# Patient Record
Sex: FEMALE | Race: WHITE | NOT HISPANIC OR LATINO | Employment: UNEMPLOYED | URBAN - METROPOLITAN AREA
[De-identification: names, ages, dates, MRNs, and addresses within clinical notes are randomized per-mention and may not be internally consistent; named-entity substitution may affect disease eponyms.]

---

## 2019-02-06 ENCOUNTER — HOSPITAL ENCOUNTER (EMERGENCY)
Facility: HOSPITAL | Age: 34
Discharge: HOME | End: 2019-02-06
Attending: INTERNAL MEDICINE
Payer: COMMERCIAL

## 2019-02-06 VITALS
WEIGHT: 180 LBS | RESPIRATION RATE: 18 BRPM | TEMPERATURE: 99 F | HEIGHT: 65 IN | HEART RATE: 87 BPM | DIASTOLIC BLOOD PRESSURE: 82 MMHG | SYSTOLIC BLOOD PRESSURE: 132 MMHG | OXYGEN SATURATION: 99 % | BODY MASS INDEX: 29.99 KG/M2

## 2019-02-06 DIAGNOSIS — S39.012A LUMBAR STRAIN, INITIAL ENCOUNTER: ICD-10-CM

## 2019-02-06 DIAGNOSIS — S16.1XXA ACUTE STRAIN OF NECK MUSCLE, INITIAL ENCOUNTER: Primary | ICD-10-CM

## 2019-02-06 PROCEDURE — 72125 CT NECK SPINE W/O DYE: CPT | Mod: TC

## 2019-02-06 PROCEDURE — 96372 THER/PROPH/DIAG INJ SC/IM: CPT

## 2019-02-06 PROCEDURE — 99284 EMERGENCY DEPT VISIT MOD MDM: CPT | Mod: 25

## 2019-02-06 PROCEDURE — 72131 CT LUMBAR SPINE W/O DYE: CPT | Mod: TC

## 2019-02-06 PROCEDURE — 72125 CT CERVICAL SPINE WITHOUT CONTRAST: ICD-10-PCS | Mod: 26,,, | Performed by: RADIOLOGY

## 2019-02-06 PROCEDURE — 72131 CT LUMBAR SPINE W/O DYE: CPT | Mod: 26,,, | Performed by: RADIOLOGY

## 2019-02-06 PROCEDURE — 72131 CT LUMBAR SPINE WITHOUT CONTRAST: ICD-10-PCS | Mod: 26,,, | Performed by: RADIOLOGY

## 2019-02-06 PROCEDURE — 72125 CT NECK SPINE W/O DYE: CPT | Mod: 26,,, | Performed by: RADIOLOGY

## 2019-02-06 PROCEDURE — 63600175 PHARM REV CODE 636 W HCPCS: Performed by: INTERNAL MEDICINE

## 2019-02-06 RX ORDER — CYCLOBENZAPRINE HCL 10 MG
10 TABLET ORAL 3 TIMES DAILY PRN
Qty: 15 TABLET | Refills: 0 | Status: SHIPPED | OUTPATIENT
Start: 2019-02-06 | End: 2019-02-11

## 2019-02-06 RX ORDER — METAXALONE 800 MG/1
800 TABLET ORAL 3 TIMES DAILY
Qty: 15 TABLET | Refills: 0 | Status: SHIPPED | OUTPATIENT
Start: 2019-02-06 | End: 2019-02-11

## 2019-02-06 RX ORDER — NAPROXEN 500 MG/1
500 TABLET ORAL 2 TIMES DAILY WITH MEALS
Qty: 60 TABLET | Refills: 0 | Status: SHIPPED | OUTPATIENT
Start: 2019-02-06 | End: 2020-06-17

## 2019-02-06 RX ORDER — KETOROLAC TROMETHAMINE 30 MG/ML
60 INJECTION, SOLUTION INTRAMUSCULAR; INTRAVENOUS
Status: COMPLETED | OUTPATIENT
Start: 2019-02-06 | End: 2019-02-06

## 2019-02-06 RX ADMIN — KETOROLAC TROMETHAMINE 60 MG: 30 INJECTION, SOLUTION INTRAMUSCULAR; INTRAVENOUS at 02:02
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
## 2023-03-06 ENCOUNTER — HOSPITAL ENCOUNTER (EMERGENCY)
Facility: HOSPITAL | Age: 38
Discharge: HOME | End: 2023-03-06
Attending: FAMILY MEDICINE
Payer: MEDICAID

## 2023-03-06 VITALS
WEIGHT: 173 LBS | HEART RATE: 86 BPM | OXYGEN SATURATION: 100 % | BODY MASS INDEX: 28.82 KG/M2 | RESPIRATION RATE: 18 BRPM | TEMPERATURE: 98 F | SYSTOLIC BLOOD PRESSURE: 115 MMHG | DIASTOLIC BLOOD PRESSURE: 74 MMHG | HEIGHT: 65 IN

## 2023-03-06 DIAGNOSIS — B00.89 HERPETIC WHITLOW: Primary | ICD-10-CM

## 2023-03-06 PROCEDURE — 99283 EMERGENCY DEPT VISIT LOW MDM: CPT

## 2023-03-06 RX ORDER — VALACYCLOVIR HYDROCHLORIDE 1 G/1
1000 TABLET, FILM COATED ORAL 3 TIMES DAILY
Qty: 21 TABLET | Refills: 0 | Status: SHIPPED | OUTPATIENT
Start: 2023-03-06 | End: 2024-03-05

## 2023-03-06 NOTE — ED PROVIDER NOTES
Encounter Date: 3/6/2023       History     Chief Complaint   Patient presents with    Hand Pain     Pt c/o redness/swelling to R middle finger. Reports previous infection in same finger in October. Reports soreness started on Thursday, has progressively gotten worse since then.      Patient presents the ER with complaint of right middle finger pain.  The patient states October this last year she had the same lesions on her finger or was multiple small dots it became painful states she had 2 rounds of antibiotics and a resolved.  The patient states Thursday she noticed some soreness to the pad of her finger states now she feels it may be infected with a similar problem from October and she presents for emergent evaluation.  Patient denied any fever nausea vomiting diarrhea or other associated symptoms presents for emergent evaluation.  Patient denies drainage from the wound or decrease in range of motion of the finger.    The history is provided by the patient.   Review of patient's allergies indicates:   Allergen Reactions    Iodine and iodide containing products Other (See Comments)    Latex, natural rubber Rash     History reviewed. No pertinent past medical history.  Past Surgical History:   Procedure Laterality Date    breast aumentation      HEMORRHOID SURGERY      WISDOM TOOTH EXTRACTION       Family History   Problem Relation Age of Onset    Diabetes Father     Hypertension Father     Diabetes Mother     Hypertension Mother      Social History     Tobacco Use    Smoking status: Never    Smokeless tobacco: Never   Substance Use Topics    Alcohol use: Yes    Drug use: Never     Review of Systems   Constitutional:  Negative for fever.   Respiratory:  Negative for cough and shortness of breath.    Gastrointestinal:  Negative for abdominal pain, constipation, diarrhea and vomiting.   Musculoskeletal:  Negative for back pain and neck pain.   Skin:  Positive for rash (Right middle finger with swelling). Negative for  color change and wound.   All other systems reviewed and are negative.    Physical Exam     Initial Vitals [03/06/23 1038]   BP Pulse Resp Temp SpO2   115/74 86 18 98.1 °F (36.7 °C) 100 %      MAP       --         Physical Exam    Nursing note and vitals reviewed.  Constitutional: Vital signs are normal. She appears well-developed and well-nourished. She is cooperative.  Non-toxic appearance. She does not appear ill. No distress.   HENT:   Head: Atraumatic.   Mouth/Throat: Oropharynx is clear and moist.   Eyes: Right eye exhibits no discharge. Left eye exhibits no discharge.   Cardiovascular:  Normal rate and regular rhythm.           Pulmonary/Chest: No respiratory distress.   Musculoskeletal:         General: Normal range of motion.     Neurological: She is alert and oriented to person, place, and time. GCS score is 15. GCS eye subscore is 4. GCS verbal subscore is 5. GCS motor subscore is 6.   Skin: Skin is warm and dry. Capillary refill takes less than 2 seconds.   Mildly erythematic raised fluctuant lesion to for surface of right middle finger concerning for herpetic darinel measuring 0.5 cm by 0.5 cm   Psychiatric: She has a normal mood and affect. Her speech is normal and behavior is normal.       ED Course   Procedures  Labs Reviewed - No data to display       Imaging Results    None          Medications - No data to display  Medical Decision Making:   Differential Diagnosis:   Herpetic darinel, abscess, wart, cellulitis  ED Management:  Discussed with the patient exam findings discussed plan of care patient's history and exam is extremely concerning for herpetic darinel the snow and the I&D was performed in the emergency room.  The patient was prescribed Bactrim yesterday advised patient continue taking her Bactrim as well as star Valtrex to treat for herpetic darinel.  The patient verbalized her understanding her questions were answered.  Time of discharge patient is stable nontoxic appearing.                         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 Provider    valACYclovir (VALTREX) 1000 MG tablet Take 1 tablet (1,000 mg total) by mouth 3 (three) times daily. for 7 days 21 tablet 3/6/2023 3/13/2023 OLIVIA Sanz          Follow-up Information       Follow up With Specialties Details Why Contact [facility] Emergency Dept Emergency Medicine  If symptoms worsen [address] [address] [phone]             OLIVIA Sanz  03/06/23 1111

## 2024-07-15 ENCOUNTER — HOSPITAL ENCOUNTER (OUTPATIENT)
Dept: RADIOLOGY | Facility: HOSPITAL | Age: 39
Discharge: HOME | End: 2024-07-15
Attending: PHYSICIAN ASSISTANT
Payer: COMMERCIAL

## 2024-07-15 DIAGNOSIS — M67.911 UNSPECIFIED DISORDER OF SYNOVIUM AND TENDON, RIGHT SHOULDER: ICD-10-CM

## 2024-07-29 ENCOUNTER — PATIENT MESSAGE (OUTPATIENT)
Dept: FAMILY MEDICINE | Facility: CLINIC | Age: 39
End: 2024-07-29
Payer: COMMERCIAL

## 2024-07-30 ENCOUNTER — LAB VISIT (OUTPATIENT)
Dept: LAB | Facility: CLINIC | Age: 39
End: 2024-07-30
Payer: COMMERCIAL

## 2024-07-30 ENCOUNTER — OFFICE VISIT (OUTPATIENT)
Dept: FAMILY MEDICINE | Facility: CLINIC | Age: 39
End: 2024-07-30
Payer: COMMERCIAL

## 2024-07-30 VITALS
DIASTOLIC BLOOD PRESSURE: 60 MMHG | WEIGHT: 190 LBS | BODY MASS INDEX: 31.65 KG/M2 | HEIGHT: 65 IN | RESPIRATION RATE: 18 BRPM | OXYGEN SATURATION: 99 % | SYSTOLIC BLOOD PRESSURE: 105 MMHG

## 2024-07-30 DIAGNOSIS — Z11.59 NEED FOR HEPATITIS C SCREENING TEST: ICD-10-CM

## 2024-07-30 DIAGNOSIS — Z11.4 ENCOUNTER FOR SCREENING FOR HIV: ICD-10-CM

## 2024-07-30 DIAGNOSIS — E66.09 CLASS 1 OBESITY DUE TO EXCESS CALORIES WITHOUT SERIOUS COMORBIDITY WITH BODY MASS INDEX (BMI) OF 31.0 TO 31.9 IN ADULT: ICD-10-CM

## 2024-07-30 DIAGNOSIS — M54.12 CERVICAL RADICULOPATHY: ICD-10-CM

## 2024-07-30 DIAGNOSIS — Z76.89 ENCOUNTER TO ESTABLISH CARE: ICD-10-CM

## 2024-07-30 DIAGNOSIS — E66.09 CLASS 1 OBESITY DUE TO EXCESS CALORIES WITHOUT SERIOUS COMORBIDITY WITH BODY MASS INDEX (BMI) OF 31.0 TO 31.9 IN ADULT: Primary | ICD-10-CM

## 2024-07-30 PROBLEM — E66.811 CLASS 1 OBESITY DUE TO EXCESS CALORIES WITHOUT SERIOUS COMORBIDITY WITH BODY MASS INDEX (BMI) OF 31.0 TO 31.9 IN ADULT: Status: ACTIVE | Noted: 2024-07-30

## 2024-07-30 LAB
ALBUMIN SERPL BCP-MCNC: 3.9 G/DL (ref 3.5–5.2)
ALP SERPL-CCNC: 73 U/L (ref 55–135)
ALT SERPL W/O P-5'-P-CCNC: 23 U/L (ref 10–44)
ANION GAP SERPL CALC-SCNC: 4 MMOL/L (ref 8–16)
AST SERPL-CCNC: 18 U/L (ref 10–40)
BASOPHILS # BLD AUTO: 0.06 K/UL (ref 0–0.2)
BASOPHILS NFR BLD: 0.9 % (ref 0–1.9)
BILIRUB SERPL-MCNC: 0.4 MG/DL (ref 0.1–1)
BUN SERPL-MCNC: 10 MG/DL (ref 6–20)
CALCIUM SERPL-MCNC: 9.1 MG/DL (ref 8.7–10.5)
CHLORIDE SERPL-SCNC: 107 MMOL/L (ref 95–110)
CHOLEST SERPL-MCNC: 177 MG/DL (ref 120–199)
CHOLEST/HDLC SERPL: 3.5 {RATIO} (ref 2–5)
CO2 SERPL-SCNC: 26 MMOL/L (ref 23–29)
CREAT SERPL-MCNC: 0.8 MG/DL (ref 0.5–1.4)
DIFFERENTIAL METHOD BLD: NORMAL
EOSINOPHIL # BLD AUTO: 0.1 K/UL (ref 0–0.5)
EOSINOPHIL NFR BLD: 0.9 % (ref 0–8)
ERYTHROCYTE [DISTWIDTH] IN BLOOD BY AUTOMATED COUNT: 12 % (ref 11.5–14.5)
EST. GFR  (NO RACE VARIABLE): >60 ML/MIN/1.73 M^2
ESTIMATED AVG GLUCOSE: 103 MG/DL (ref 68–131)
GLUCOSE SERPL-MCNC: 91 MG/DL (ref 70–110)
HBA1C MFR BLD: 5.2 % (ref 4–5.6)
HCT VFR BLD AUTO: 39.5 % (ref 37–48.5)
HCV AB SERPL QL IA: NORMAL
HDLC SERPL-MCNC: 50 MG/DL (ref 40–75)
HDLC SERPL: 28.2 % (ref 20–50)
HGB BLD-MCNC: 12.9 G/DL (ref 12–16)
HIV 1+2 AB+HIV1 P24 AG SERPL QL IA: NORMAL
IMM GRANULOCYTES # BLD AUTO: 0.01 K/UL (ref 0–0.04)
IMM GRANULOCYTES NFR BLD AUTO: 0.1 % (ref 0–0.5)
LDLC SERPL CALC-MCNC: 109.6 MG/DL (ref 63–159)
LYMPHOCYTES # BLD AUTO: 2.5 K/UL (ref 1–4.8)
LYMPHOCYTES NFR BLD: 36 % (ref 18–48)
MCH RBC QN AUTO: 30.3 PG (ref 27–31)
MCHC RBC AUTO-ENTMCNC: 32.7 G/DL (ref 32–36)
MCV RBC AUTO: 93 FL (ref 82–98)
MONOCYTES # BLD AUTO: 0.6 K/UL (ref 0.3–1)
MONOCYTES NFR BLD: 8.2 % (ref 4–15)
NEUTROPHILS # BLD AUTO: 3.8 K/UL (ref 1.8–7.7)
NEUTROPHILS NFR BLD: 53.9 % (ref 38–73)
NONHDLC SERPL-MCNC: 127 MG/DL
NRBC BLD-RTO: 0 /100 WBC
PLATELET # BLD AUTO: 384 K/UL (ref 150–450)
PMV BLD AUTO: 10.4 FL (ref 9.2–12.9)
POTASSIUM SERPL-SCNC: 3.9 MMOL/L (ref 3.5–5.1)
PROT SERPL-MCNC: 7.3 G/DL (ref 6–8.4)
RBC # BLD AUTO: 4.26 M/UL (ref 4–5.4)
SODIUM SERPL-SCNC: 137 MMOL/L (ref 136–145)
TRIGL SERPL-MCNC: 87 MG/DL (ref 30–150)
WBC # BLD AUTO: 6.95 K/UL (ref 3.9–12.7)

## 2024-07-30 PROCEDURE — 3008F BODY MASS INDEX DOCD: CPT | Mod: CPTII,S$GLB,, | Performed by: STUDENT IN AN ORGANIZED HEALTH CARE EDUCATION/TRAINING PROGRAM

## 2024-07-30 PROCEDURE — 86803 HEPATITIS C AB TEST: CPT | Performed by: STUDENT IN AN ORGANIZED HEALTH CARE EDUCATION/TRAINING PROGRAM

## 2024-07-30 PROCEDURE — 1159F MED LIST DOCD IN RCRD: CPT | Mod: CPTII,S$GLB,, | Performed by: STUDENT IN AN ORGANIZED HEALTH CARE EDUCATION/TRAINING PROGRAM

## 2024-07-30 PROCEDURE — 87389 HIV-1 AG W/HIV-1&-2 AB AG IA: CPT | Performed by: STUDENT IN AN ORGANIZED HEALTH CARE EDUCATION/TRAINING PROGRAM

## 2024-07-30 PROCEDURE — 83036 HEMOGLOBIN GLYCOSYLATED A1C: CPT | Performed by: STUDENT IN AN ORGANIZED HEALTH CARE EDUCATION/TRAINING PROGRAM

## 2024-07-30 PROCEDURE — 85025 COMPLETE CBC W/AUTO DIFF WBC: CPT | Performed by: STUDENT IN AN ORGANIZED HEALTH CARE EDUCATION/TRAINING PROGRAM

## 2024-07-30 PROCEDURE — 3074F SYST BP LT 130 MM HG: CPT | Mod: CPTII,S$GLB,, | Performed by: STUDENT IN AN ORGANIZED HEALTH CARE EDUCATION/TRAINING PROGRAM

## 2024-07-30 PROCEDURE — 99214 OFFICE O/P EST MOD 30 MIN: CPT | Mod: S$GLB,,, | Performed by: STUDENT IN AN ORGANIZED HEALTH CARE EDUCATION/TRAINING PROGRAM

## 2024-07-30 PROCEDURE — 80053 COMPREHEN METABOLIC PANEL: CPT | Performed by: STUDENT IN AN ORGANIZED HEALTH CARE EDUCATION/TRAINING PROGRAM

## 2024-07-30 PROCEDURE — 80061 LIPID PANEL: CPT | Performed by: STUDENT IN AN ORGANIZED HEALTH CARE EDUCATION/TRAINING PROGRAM

## 2024-07-30 PROCEDURE — 36415 COLL VENOUS BLD VENIPUNCTURE: CPT | Mod: ,,, | Performed by: STUDENT IN AN ORGANIZED HEALTH CARE EDUCATION/TRAINING PROGRAM

## 2024-07-30 PROCEDURE — 3078F DIAST BP <80 MM HG: CPT | Mod: CPTII,S$GLB,, | Performed by: STUDENT IN AN ORGANIZED HEALTH CARE EDUCATION/TRAINING PROGRAM

## 2024-07-30 RX ORDER — MELOXICAM 15 MG/1
15 TABLET ORAL DAILY
Qty: 30 TABLET | Refills: 1 | Status: SHIPPED | OUTPATIENT
Start: 2024-07-30

## 2024-07-30 RX ORDER — ALPRAZOLAM 1 MG/1
1 TABLET ORAL 2 TIMES DAILY
Qty: 2 TABLET | Refills: 0 | Status: SHIPPED | OUTPATIENT
Start: 2024-07-30 | End: 2024-08-29

## 2024-07-30 RX ORDER — GABAPENTIN 300 MG/1
300 CAPSULE ORAL 3 TIMES DAILY
Qty: 90 CAPSULE | Refills: 11 | Status: SHIPPED | OUTPATIENT
Start: 2024-07-30 | End: 2025-07-30

## 2024-07-30 NOTE — PROGRESS NOTES
"  [facility]  [address], [address]  [place] [address]    Subjective     Patient ID: Shruthi Dempsey is a 39 y.o. female who comes to the clinic to establish care.    Chief Complaint: Establish Care (Establish Care)    Right arm pain - has been going on for years but has worsened recently. No trauma that she's aware of. Has pain that radiates down her right arm into fingers, her fingers can go completely numb, same with her hand. Has seen pain mgmt but "they didn't do anything". Has had an injection into her back and that did not help. Had an MRI of her arm scheduled but she "got scared" and didn't follow through with it.     Had a NCS test done and it showed "problems" at her neck and elbow but that's all she knows. It was at [facility]. Was written a muscle relaxer but she never took them.     ROS negative unless stated above       Objective     Vitals:    07/30/24 1004   BP: 105/60   BP Location: Left arm   Patient Position: Sitting   BP Method: Large (Manual)   Resp: 18   SpO2: 99%   Weight: 86.2 kg (190 lb)   Height: 5' 5" (1.651 m)        Wt Readings from Last 3 Encounters:   07/30/24 1004 86.2 kg (190 lb)   03/05/24 1424 83.9 kg (185 lb)   03/06/23 1038 78.5 kg (173 lb)        Physical Exam  Vitals reviewed.   Constitutional:       Appearance: Normal appearance.   HENT:      Head: Normocephalic and atraumatic.   Eyes:      Extraocular Movements: Extraocular movements intact.      Pupils: Pupils are equal, round, and reactive to light.   Cardiovascular:      Rate and Rhythm: Normal rate.      Pulses: Normal pulses.      Heart sounds: Normal heart sounds.   Pulmonary:      Effort: Pulmonary effort is normal. No respiratory distress.      Breath sounds: Normal breath sounds.   Musculoskeletal:         General: Normal range of motion.      Cervical back: Normal range of motion and neck supple.   Skin:     General: Skin is dry.      Capillary Refill: " Capillary refill takes less than 2 seconds.   Neurological:      General: No focal deficit present.      Mental Status: She is alert and oriented to person, place, and time. Mental status is at baseline.   Psychiatric:         Mood and Affect: Mood normal.         Behavior: Behavior normal.         Thought Content: Thought content normal.         Current Outpatient Medications   Medication Instructions    ALPRAZolam (XANAX) 1 mg, Oral, 2 times daily    gabapentin (NEURONTIN) 300 mg, Oral, 3 times daily    meloxicam (MOBIC) 15 mg, Oral, Daily           Assessment and Plan     1. Class 1 obesity due to excess calories without serious comorbidity with body mass index (BMI) of 31.0 to 31.9 in adult  -     Lipid panel; Future; Expected date: 07/30/2024  -     Hemoglobin A1c; Future; Expected date: 07/30/2024  -     Comprehensive metabolic panel; Future; Expected date: 07/30/2024  -     CBC auto differential; Future; Expected date: 07/30/2024    2. Encounter to establish care    3. Encounter for screening for HIV  -     HIV 1/2 Ag/Ab (4th Gen); Future; Expected date: 07/30/2024    4. Need for hepatitis C screening test  -     Hepatitis C antibody; Future; Expected date: 07/30/2024    5. Cervical radiculopathy  -     meloxicam (MOBIC) 15 MG tablet; Take 1 tablet (15 mg total) by mouth once daily.  Dispense: 30 tablet; Refill: 1  -     ALPRAZolam (XANAX) 1 MG tablet; Take 1 tablet (1 mg total) by mouth 2 (two) times daily.  Dispense: 2 tablet; Refill: 0  -     gabapentin (NEURONTIN) 300 MG capsule; Take 1 capsule (300 mg total) by mouth 3 (three) times daily.  Dispense: 90 capsule; Refill: 11  -     MRI Cervical Spine Without Contrast; Future; Expected date: 07/30/2024        Here to establish care    For radiculopathy, sounds like it's coming from her neck since it's affecting her whole arm. Need imaging but patient has anxiety, writing xanax for this, can take two if she's still not feeling the effect prior to the study.  Writing mobic and gabapentin as well, went over SE's    For labs, see above    RTC in 1 month         I encouraged the patient to take all medications as prescribed and to keep follow up appointments with their providers. Patient stated they had no other concerns. Questions were invited and answered. Follow up sooner if needed.     Harinder Mccray MD  07/30/2024 10:12 AM

## 2024-08-13 ENCOUNTER — TELEPHONE (OUTPATIENT)
Dept: FAMILY MEDICINE | Facility: CLINIC | Age: 39
End: 2024-08-13
Payer: COMMERCIAL

## 2024-08-13 NOTE — TELEPHONE ENCOUNTER
----- Message from Elis Vega sent at 8/13/2024 12:10 PM CDT -----  Contact: PT  Type:  Apoointment Request    Name of Caller:PT   When is the first available appointment?N/A - PT PREFERS THURS 08/15/24 MORNING   Symptoms:PANIC ATTACKS FROM RX gabapentin (NEURONTIN) 300 MG capsule OR THE meloxicam (MOBIC) 15 MG tablet  Would the patient rather a call back or a response via [facility]? CALL   Best Call Back Number:[phone] (home)   Additional Information: UNABLE TO STEWART - PT HAS [employer] EPO  THANK YOU

## 2024-08-15 ENCOUNTER — OFFICE VISIT (OUTPATIENT)
Dept: FAMILY MEDICINE | Facility: CLINIC | Age: 39
End: 2024-08-15
Payer: COMMERCIAL

## 2024-08-15 VITALS
RESPIRATION RATE: 18 BRPM | SYSTOLIC BLOOD PRESSURE: 110 MMHG | WEIGHT: 191 LBS | HEART RATE: 86 BPM | DIASTOLIC BLOOD PRESSURE: 65 MMHG | BODY MASS INDEX: 31.82 KG/M2 | OXYGEN SATURATION: 100 % | HEIGHT: 65 IN

## 2024-08-15 DIAGNOSIS — M54.12 CERVICAL RADICULOPATHY: Primary | ICD-10-CM

## 2024-08-15 DIAGNOSIS — B00.89 HERPETIC WHITLOW: ICD-10-CM

## 2024-08-15 PROCEDURE — 3008F BODY MASS INDEX DOCD: CPT | Mod: CPTII,S$GLB,, | Performed by: STUDENT IN AN ORGANIZED HEALTH CARE EDUCATION/TRAINING PROGRAM

## 2024-08-15 PROCEDURE — 1160F RVW MEDS BY RX/DR IN RCRD: CPT | Mod: CPTII,S$GLB,, | Performed by: STUDENT IN AN ORGANIZED HEALTH CARE EDUCATION/TRAINING PROGRAM

## 2024-08-15 PROCEDURE — 3044F HG A1C LEVEL LT 7.0%: CPT | Mod: CPTII,S$GLB,, | Performed by: STUDENT IN AN ORGANIZED HEALTH CARE EDUCATION/TRAINING PROGRAM

## 2024-08-15 PROCEDURE — 3078F DIAST BP <80 MM HG: CPT | Mod: CPTII,S$GLB,, | Performed by: STUDENT IN AN ORGANIZED HEALTH CARE EDUCATION/TRAINING PROGRAM

## 2024-08-15 PROCEDURE — 1159F MED LIST DOCD IN RCRD: CPT | Mod: CPTII,S$GLB,, | Performed by: STUDENT IN AN ORGANIZED HEALTH CARE EDUCATION/TRAINING PROGRAM

## 2024-08-15 PROCEDURE — 3074F SYST BP LT 130 MM HG: CPT | Mod: CPTII,S$GLB,, | Performed by: STUDENT IN AN ORGANIZED HEALTH CARE EDUCATION/TRAINING PROGRAM

## 2024-08-15 PROCEDURE — 99214 OFFICE O/P EST MOD 30 MIN: CPT | Mod: S$GLB,,, | Performed by: STUDENT IN AN ORGANIZED HEALTH CARE EDUCATION/TRAINING PROGRAM

## 2024-08-15 RX ORDER — VALACYCLOVIR HYDROCHLORIDE 1 G/1
1000 TABLET, FILM COATED ORAL DAILY
Qty: 20 TABLET | Refills: 3 | Status: SHIPPED | OUTPATIENT
Start: 2024-08-15 | End: 2024-08-20

## 2024-08-15 NOTE — PROGRESS NOTES
"  [facility]    [facility]  [address], [address]  [place] [address]    Subjective     Patient ID: Shruthi Dempsey is a 39 y.o. female who comes to the clinic for a follow up visit.    Chief Complaint: Follow-up (Medication follow up)    Palpitations - has started since taking gabapentin and mobic but she also drinks multiple cups of coffee and loaded teas.     Cervical radiculopathy - MRI cervical spine was cancelled because she didn't do PT, will order that today, mobic didn't seem to help and neither did gabapentin but it did help w/ her sleep    ROS negative unless stated above       Objective     Vitals:    08/15/24 1044   BP: 110/65   BP Location: Left arm   Patient Position: Sitting   BP Method: Large (Manual)   Pulse: 86   Resp: 18   SpO2: 100%   Weight: 86.6 kg (191 lb)   Height: 5' 5" (1.651 m)        Wt Readings from Last 3 Encounters:   08/15/24 1044 86.6 kg (191 lb)   07/30/24 1004 86.2 kg (190 lb)   03/05/24 1424 83.9 kg (185 lb)        Physical Exam  Vitals reviewed.   Constitutional:       Appearance: Normal appearance.   HENT:      Head: Normocephalic and atraumatic.   Eyes:      Extraocular Movements: Extraocular movements intact.      Pupils: Pupils are equal, round, and reactive to light.   Cardiovascular:      Rate and Rhythm: Normal rate.   Pulmonary:      Effort: Pulmonary effort is normal. No respiratory distress.   Musculoskeletal:         General: Normal range of motion.      Cervical back: Normal range of motion and neck supple.   Skin:     General: Skin is dry.      Capillary Refill: Capillary refill takes less than 2 seconds.   Neurological:      General: No focal deficit present.      Mental Status: She is alert and oriented to person, place, and time. Mental status is at baseline.   Psychiatric:         Mood and Affect: Mood normal.         Behavior: Behavior normal.         Thought Content: Thought content normal.         Current " Outpatient Medications   Medication Instructions    gabapentin (NEURONTIN) 300 mg, Oral, 3 times daily    valACYclovir (VALTREX) 1,000 mg, Oral, Daily           Assessment and Plan     1. Cervical radiculopathy  -     Ambulatory referral/consult to Physical/Occupational Therapy; Future; Expected date: 08/22/2024    2. Herpetic darinel  -     valACYclovir (VALTREX) 1000 MG tablet; Take 1 tablet (1,000 mg total) by mouth once daily. for 5 days  Dispense: 20 tablet; Refill: 3        Here for follow up    PT referral placed in [place]    For herpetic darinel, traeating w/ valtrex, gave her some extra tabs for future episodes    For palpitations, treating w/ caffeine avoidance, will hold off on treating possible anxiety but will approach at f/u if not improving    Visit today included increased complexity associated with the care of the episodic problem cervical radiculopathy addressed and managing the longitudinal care of the patient due to the serious and/or complex managed problem(s)     RTC in 2 months         I encouraged the patient to take all medications as prescribed and to keep follow up appointments with their providers. Patient stated they had no other concerns. Questions were invited and answered. Follow up sooner if needed.     Harinder Mccray MD  08/15/2024 10:48 AM

## 2024-08-22 ENCOUNTER — TELEPHONE (OUTPATIENT)
Dept: FAMILY MEDICINE | Facility: CLINIC | Age: 39
End: 2024-08-22
Payer: COMMERCIAL

## 2024-08-22 NOTE — TELEPHONE ENCOUNTER
----- Message from Christine Choi sent at 8/22/2024  2:03 PM CDT -----  Contact: pt  Type: Needs Medical Advice    Who Called: pt    Best Call Back Number:[phone]    Additional Information: Requesting a call back regarding pot needs a new referral for physical therapy. Last referral provider was out on pt network.  However another provider in same office is in network. Please send new referral to office below.       physical therapy -   Libby   [facility]  Address: [address] [place] [address] [place] [address]  Fax# [phone]    Phone: [phone]      Please Advise- Thank you

## 2024-08-22 NOTE — TELEPHONE ENCOUNTER
----- Message from Didi Hidalgo sent at 8/22/2024  3:57 PM CDT -----  Type:  Patient Returning Call    Who Called:  pt   Who Left Message for Patient:  juan   Does the patient know what this is regarding?:  yes   Best Call Back Number:  [phone] (home)     Additional Information:  please advise

## 2024-08-29 ENCOUNTER — TELEPHONE (OUTPATIENT)
Dept: FAMILY MEDICINE | Facility: CLINIC | Age: 39
End: 2024-08-29
Payer: COMMERCIAL

## 2024-09-09 ENCOUNTER — TELEPHONE (OUTPATIENT)
Dept: FAMILY MEDICINE | Facility: CLINIC | Age: 39
End: 2024-09-09
Payer: COMMERCIAL

## 2024-09-09 DIAGNOSIS — B37.9 YEAST INFECTION: Primary | ICD-10-CM

## 2024-09-09 RX ORDER — FLUCONAZOLE 150 MG/1
150 TABLET ORAL DAILY
Qty: 1 TABLET | Refills: 0 | Status: SHIPPED | OUTPATIENT
Start: 2024-09-09 | End: 2024-09-10

## 2024-09-09 NOTE — TELEPHONE ENCOUNTER
LOV 8/15/24 NOV 10/25/24 . Spoke with patient she changed soap and it has her c/o a yeast infection . Intrusted her to stop the soap and go back to her sensitive skin body wash.

## 2024-09-09 NOTE — TELEPHONE ENCOUNTER
----- Message from Sachin Moses sent at 9/9/2024 10:37 AM CDT -----  Regarding: Refill request  Type:  RX Refill Request    Who Called: PT  Refill or New Rx: NEW RX    RX Name and Strength:Diflucan   How is the patient currently taking it? (ex. 1XDay):as directed  Is this a 30 day or 90 day RX:30    Preferred Pharmacy with phone number:  [id number] - [place] [id number] [facility] [address]  [address] [place] [address]  Phone: [phone] Fax: [phone]        Local or Mail Order:local  Ordering Provider:Michelle    Would the patient rather a call back or a response via [facility]? Call back    Best Call Back Number:[phone]      Additional Information: Sts she has a yeast infection from the medication that she is on needs something for it.     Please advise -- Thank you

## 2024-10-02 ENCOUNTER — TELEPHONE (OUTPATIENT)
Dept: FAMILY MEDICINE | Facility: CLINIC | Age: 39
End: 2024-10-02
Payer: COMMERCIAL

## 2024-10-02 NOTE — TELEPHONE ENCOUNTER
----- Message from Yany sent at 10/2/2024  1:26 PM CDT -----  Contact: self  Type: Needs Medical Advice  Who Called:  pt  Symptoms (please be specific):  for extention for physcial theraphy or to have mri done to move forward  How long has patient had these symptoms:  n/a  Pharmacy name and phone #:  n/a  Best Call Back Number: [phone]   Additional Information: please call

## 2024-10-09 ENCOUNTER — OFFICE VISIT (OUTPATIENT)
Dept: FAMILY MEDICINE | Facility: CLINIC | Age: 39
End: 2024-10-09
Payer: COMMERCIAL

## 2024-10-09 VITALS
HEIGHT: 65 IN | SYSTOLIC BLOOD PRESSURE: 120 MMHG | OXYGEN SATURATION: 98 % | WEIGHT: 195 LBS | HEART RATE: 92 BPM | DIASTOLIC BLOOD PRESSURE: 74 MMHG | BODY MASS INDEX: 32.49 KG/M2

## 2024-10-09 DIAGNOSIS — F41.9 ANXIETY: ICD-10-CM

## 2024-10-09 DIAGNOSIS — E66.09 CLASS 1 OBESITY DUE TO EXCESS CALORIES WITHOUT SERIOUS COMORBIDITY WITH BODY MASS INDEX (BMI) OF 31.0 TO 31.9 IN ADULT: ICD-10-CM

## 2024-10-09 DIAGNOSIS — M54.12 CERVICAL RADICULOPATHY: Primary | ICD-10-CM

## 2024-10-09 DIAGNOSIS — E66.811 CLASS 1 OBESITY DUE TO EXCESS CALORIES WITHOUT SERIOUS COMORBIDITY WITH BODY MASS INDEX (BMI) OF 31.0 TO 31.9 IN ADULT: ICD-10-CM

## 2024-10-09 PROCEDURE — 3078F DIAST BP <80 MM HG: CPT | Mod: CPTII,S$GLB,, | Performed by: STUDENT IN AN ORGANIZED HEALTH CARE EDUCATION/TRAINING PROGRAM

## 2024-10-09 PROCEDURE — 1160F RVW MEDS BY RX/DR IN RCRD: CPT | Mod: CPTII,S$GLB,, | Performed by: STUDENT IN AN ORGANIZED HEALTH CARE EDUCATION/TRAINING PROGRAM

## 2024-10-09 PROCEDURE — 1159F MED LIST DOCD IN RCRD: CPT | Mod: CPTII,S$GLB,, | Performed by: STUDENT IN AN ORGANIZED HEALTH CARE EDUCATION/TRAINING PROGRAM

## 2024-10-09 PROCEDURE — 99214 OFFICE O/P EST MOD 30 MIN: CPT | Mod: S$GLB,,, | Performed by: STUDENT IN AN ORGANIZED HEALTH CARE EDUCATION/TRAINING PROGRAM

## 2024-10-09 PROCEDURE — 3044F HG A1C LEVEL LT 7.0%: CPT | Mod: CPTII,S$GLB,, | Performed by: STUDENT IN AN ORGANIZED HEALTH CARE EDUCATION/TRAINING PROGRAM

## 2024-10-09 PROCEDURE — 3074F SYST BP LT 130 MM HG: CPT | Mod: CPTII,S$GLB,, | Performed by: STUDENT IN AN ORGANIZED HEALTH CARE EDUCATION/TRAINING PROGRAM

## 2024-10-09 PROCEDURE — 3008F BODY MASS INDEX DOCD: CPT | Mod: CPTII,S$GLB,, | Performed by: STUDENT IN AN ORGANIZED HEALTH CARE EDUCATION/TRAINING PROGRAM

## 2024-10-09 RX ORDER — MELOXICAM 15 MG/1
15 TABLET ORAL
COMMUNITY
Start: 2024-08-27

## 2024-10-09 RX ORDER — ALPRAZOLAM 1 MG/1
1 TABLET ORAL ONCE
Qty: 1 TABLET | Refills: 0 | Status: SHIPPED | OUTPATIENT
Start: 2024-10-09 | End: 2024-10-09

## 2024-10-09 RX ORDER — BUPROPION HYDROCHLORIDE 150 MG/1
150 TABLET ORAL DAILY
Qty: 30 TABLET | Refills: 11 | Status: SHIPPED | OUTPATIENT
Start: 2024-10-09 | End: 2025-10-09

## 2024-10-09 NOTE — PROGRESS NOTES
"  [facility]  [address], [address]  [place] [address]    Subjective     Patient ID: Shruthi Dempsey is a 39 y.o. female who comes to the clinic for a follow up visit.    Chief Complaint: Follow-up    Palpitations - started since taking gabapentin but she also drinks multiple cups of coffee and loaded teas. Improved when she stopped gabapentin     Cervical radiculopathy - MRI cervical spine was cancelled because she didn't do PT, has now completed that, mobic didn't seem to help and neither did gabapentin but it did help w/ her sleep    ROS negative unless stated above       Objective     Vitals:    10/09/24 1338   BP: 120/74   Pulse: 92   SpO2: 98%   Weight: 88.5 kg (195 lb)   Height: 5' 5" (1.651 m)        Wt Readings from Last 3 Encounters:   10/09/24 1338 88.5 kg (195 lb)   08/15/24 1044 86.6 kg (191 lb)   07/30/24 1004 86.2 kg (190 lb)        Physical Exam  Constitutional:       General: She is not in acute distress.     Appearance: Normal appearance. She is not ill-appearing.   HENT:      Head: Normocephalic and atraumatic.      Mouth/Throat:      Mouth: Mucous membranes are moist.   Eyes:      Extraocular Movements: Extraocular movements intact.      Pupils: Pupils are equal, round, and reactive to light.   Cardiovascular:      Rate and Rhythm: Normal rate.   Pulmonary:      Effort: Pulmonary effort is normal. No respiratory distress.   Musculoskeletal:         General: Normal range of motion.      Cervical back: Normal range of motion and neck supple.   Skin:     General: Skin is warm and dry.   Neurological:      General: No focal deficit present.      Mental Status: She is alert and oriented to person, place, and time. Mental status is at baseline.   Psychiatric:         Mood and Affect: Mood normal.         Behavior: Behavior normal.         Thought Content: Thought content normal.         Current Outpatient Medications   Medication " Instructions    ALPRAZolam (XANAX) 1 mg, Oral, Once    buPROPion (WELLBUTRIN XL) 150 mg, Oral, Daily    gabapentin (NEURONTIN) 300 mg, Oral, 3 times daily    meloxicam (MOBIC) 15 mg    valACYclovir (VALTREX) 1,000 mg, Oral, Daily           Assessment and Plan     1. Cervical radiculopathy  -     MRI Cervical Spine Without Contrast; Future; Expected date: 10/09/2024    2. Anxiety  -     ALPRAZolam (XANAX) 1 MG tablet; Take 1 tablet (1 mg total) by mouth once. for 1 dose  Dispense: 1 tablet; Refill: 0    3. Class 1 obesity due to excess calories without serious comorbidity with body mass index (BMI) of 31.0 to 31.9 in adult  -     buPROPion (WELLBUTRIN XL) 150 MG TB24 tablet; Take 1 tablet (150 mg total) by mouth once daily.  Dispense: 30 tablet; Refill: 11        Here for follow up.    For neck pain, PT did not really help, ordering MRI cervical spine, will likely refer to ortho after that    For anxiety, writing Xanax 1mg 1 hour prior to scan, no driving while on this    For obesity, starting wellbutrin, went over SE's    The visit today included increased complexity associated with the care of an episodic problem, namely neck pain, that was addressed and managed via longitudinal care.    RTC in 6 months        I encouraged the patient to take all medications as prescribed and to keep follow up appointments with their providers. ED precautions given for more concerning issues. Questions were invited and answered. Patient stated they had no other concerns. Follow up sooner if needed.     Harinder Mccray MD  10/09/2024 1:41 PM

## 2024-10-14 ENCOUNTER — TELEPHONE (OUTPATIENT)
Dept: FAMILY MEDICINE | Facility: CLINIC | Age: 39
End: 2024-10-14
Payer: COMMERCIAL

## 2024-10-14 NOTE — TELEPHONE ENCOUNTER
----- Message from Elis sent at 10/14/2024  1:07 PM CDT -----  Contact: PT  Type:  Needs Medical Advice    Who Called: PT   Symptoms (please be specific): PLEASE CALL TO DISCUSS CONCERNS WITH buPROPion (WELLBUTRIN XL) 150 MG TB24 tablet - RX  MADE HER ANGRY... PT FELT LIKE  SHE WANTED TO PUNCH SOMEONE IN THE FACE.   How long has patient had these symptoms:  WITHIN 24 HRS OF TAKING IT   Pharmacy name and phone #:    [facility] #[id number] - [place], [place] - [address] [place] [address] [address]  [address] [place] [address]  Phone: [phone] Fax: [phone]  Would the patient rather a call back or a response via [facility]? CALL   Best Call Back Number: [phone]  Additional Information: THANK YOU

## 2024-10-21 ENCOUNTER — TELEPHONE (OUTPATIENT)
Dept: FAMILY MEDICINE | Facility: CLINIC | Age: 39
End: 2024-10-21
Payer: COMMERCIAL

## 2024-10-21 ENCOUNTER — HOSPITAL ENCOUNTER (OUTPATIENT)
Dept: RADIOLOGY | Facility: HOSPITAL | Age: 39
Discharge: HOME | End: 2024-10-21
Attending: STUDENT IN AN ORGANIZED HEALTH CARE EDUCATION/TRAINING PROGRAM
Payer: COMMERCIAL

## 2024-10-21 DIAGNOSIS — M54.12 CERVICAL RADICULOPATHY: ICD-10-CM

## 2024-10-21 PROCEDURE — 72141 MRI NECK SPINE W/O DYE: CPT | Mod: 26,,, | Performed by: RADIOLOGY

## 2024-10-21 PROCEDURE — 72141 MRI NECK SPINE W/O DYE: CPT | Mod: TC

## 2024-10-22 ENCOUNTER — OFFICE VISIT (OUTPATIENT)
Dept: FAMILY MEDICINE | Facility: CLINIC | Age: 39
End: 2024-10-22
Payer: COMMERCIAL

## 2024-10-22 VITALS
HEART RATE: 98 BPM | WEIGHT: 197 LBS | OXYGEN SATURATION: 99 % | SYSTOLIC BLOOD PRESSURE: 126 MMHG | DIASTOLIC BLOOD PRESSURE: 78 MMHG | BODY MASS INDEX: 32.82 KG/M2 | HEIGHT: 65 IN

## 2024-10-22 DIAGNOSIS — M54.2 CHRONIC NECK PAIN: ICD-10-CM

## 2024-10-22 DIAGNOSIS — G89.29 CHRONIC NECK PAIN: ICD-10-CM

## 2024-10-22 DIAGNOSIS — G95.89 MYELOMALACIA OF CERVICAL CORD: Primary | ICD-10-CM

## 2024-10-22 PROCEDURE — 1159F MED LIST DOCD IN RCRD: CPT | Mod: CPTII,S$GLB,, | Performed by: STUDENT IN AN ORGANIZED HEALTH CARE EDUCATION/TRAINING PROGRAM

## 2024-10-22 PROCEDURE — 3008F BODY MASS INDEX DOCD: CPT | Mod: CPTII,S$GLB,, | Performed by: STUDENT IN AN ORGANIZED HEALTH CARE EDUCATION/TRAINING PROGRAM

## 2024-10-22 PROCEDURE — 3074F SYST BP LT 130 MM HG: CPT | Mod: CPTII,S$GLB,, | Performed by: STUDENT IN AN ORGANIZED HEALTH CARE EDUCATION/TRAINING PROGRAM

## 2024-10-22 PROCEDURE — 3078F DIAST BP <80 MM HG: CPT | Mod: CPTII,S$GLB,, | Performed by: STUDENT IN AN ORGANIZED HEALTH CARE EDUCATION/TRAINING PROGRAM

## 2024-10-22 PROCEDURE — G2211 COMPLEX E/M VISIT ADD ON: HCPCS | Mod: S$GLB,,, | Performed by: STUDENT IN AN ORGANIZED HEALTH CARE EDUCATION/TRAINING PROGRAM

## 2024-10-22 PROCEDURE — 99214 OFFICE O/P EST MOD 30 MIN: CPT | Mod: S$GLB,,, | Performed by: STUDENT IN AN ORGANIZED HEALTH CARE EDUCATION/TRAINING PROGRAM

## 2024-10-22 PROCEDURE — 3044F HG A1C LEVEL LT 7.0%: CPT | Mod: CPTII,S$GLB,, | Performed by: STUDENT IN AN ORGANIZED HEALTH CARE EDUCATION/TRAINING PROGRAM

## 2024-10-22 NOTE — PROGRESS NOTES
"  [facility]  [address], [address]  [place] [address]    Subjective     Patient ID: Shruthi Dempsey is a 39 y.o. female who comes to the clinic for a follow up visit.    Chief Complaint: Weight Loss (Patient is here to discuss weight loss, also discuss her MRI results.)    Weight loss - couldn't tolerate Wellbutrin as it made her irritable.     Neck pain - MRI came back with a lot of abnormalities, will refer to neurosurgery. See below    Results for orders placed or performed during the hospital encounter of 10/21/24 (from the past 2160 hours)   MRI Cervical Spine Without Contrast    Impression    1. Poorly defined foci of signal abnormality within the central spinal cord extending from C5 through C7 suspicious for foci of myelomalacia.  Differential diagnosis includes demyelination as well as spinal cord infarction.  2. Multilevel degenerative disc disease C2 through C4 resulting in mild central spinal canal stenosis with mild to moderate neural foraminal narrowing.  3. Multilevel degenerative disc disease C4 through C6 resulting in severe central spinal canal stenosis with moderate to severe neural foraminal narrowing.  4. Degenerative disc disease at C6-C7 resulting in moderate central spinal canal stenosis with moderate neural foraminal narrowing.  This report was flagged in Epic as abnormal.      Electronically signed by: Tanvir Hooper  Date:    10/21/2024  Time:    12:25         ROS negative unless stated above       Objective     Vitals:    10/22/24 1336   BP: 126/78   BP Location: Right arm   Patient Position: Sitting   Pulse: 98   SpO2: 99%   Weight: 89.4 kg (197 lb)   Height: 5' 5" (1.651 m)        Wt Readings from Last 3 Encounters:   10/22/24 1336 89.4 kg (197 lb)   10/09/24 1338 88.5 kg (195 lb)   08/15/24 1044 86.6 kg (191 lb)        Physical Exam  Constitutional:       General: She is not in acute distress.     Appearance: Normal " appearance. She is not ill-appearing.   HENT:      Head: Normocephalic and atraumatic.      Mouth/Throat:      Mouth: Mucous membranes are moist.   Eyes:      Extraocular Movements: Extraocular movements intact.      Pupils: Pupils are equal, round, and reactive to light.   Cardiovascular:      Rate and Rhythm: Normal rate.   Pulmonary:      Effort: Pulmonary effort is normal. No respiratory distress.   Musculoskeletal:         General: Normal range of motion.      Cervical back: Normal range of motion and neck supple.   Skin:     General: Skin is warm and dry.   Neurological:      General: No focal deficit present.      Mental Status: She is alert and oriented to person, place, and time. Mental status is at baseline.   Psychiatric:         Mood and Affect: Mood normal.         Behavior: Behavior normal.         Thought Content: Thought content normal.         Current Outpatient Medications   Medication Instructions    meloxicam (MOBIC) 15 mg    valACYclovir (VALTREX) 1,000 mg, Oral, Daily           Assessment and Plan     1. Myelomalacia of cervical cord    2. Chronic neck pain        Here for follow up.    For spinal cord pathology, referring to neurosurgery but first she has to call her insurance and see who they cover.  She will send me a message either today or tomorrow with who is in network.    For weight loss, could not tolerate Wellbutrin.  We will discontinue this.  Whenever need for strict calorie deficit and avoiding carbohydrates.  She said she will try this and let me know if it is not working. May refer to weight mgmt in 3 months    The visit today included increased complexity associated with the care of an episodic problem, namely neck pain, that was addressed and managed via longitudinal care.    RTC in 3 months        I encouraged the patient to take all medications as prescribed and to keep follow up appointments with their providers. ED precautions given for more concerning issues. Questions  were invited and answered. Patient stated they had no other concerns. Follow up sooner if needed.     Harinder Mccray MD  10/22/2024 1:41 PM

## 2024-10-23 ENCOUNTER — PATIENT MESSAGE (OUTPATIENT)
Dept: FAMILY MEDICINE | Facility: CLINIC | Age: 39
End: 2024-10-23
Payer: COMMERCIAL

## 2024-10-23 DIAGNOSIS — M54.12 CERVICAL RADICULOPATHY: Primary | ICD-10-CM

## 2024-10-30 ENCOUNTER — TELEPHONE (OUTPATIENT)
Dept: FAMILY MEDICINE | Facility: CLINIC | Age: 39
End: 2024-10-30
Payer: COMMERCIAL

## 2025-03-17 DIAGNOSIS — B00.89 HERPETIC WHITLOW: ICD-10-CM

## 2025-03-17 RX ORDER — VALACYCLOVIR HYDROCHLORIDE 1 G/1
1000 TABLET, FILM COATED ORAL DAILY
Qty: 20 TABLET | Refills: 1 | Status: SHIPPED | OUTPATIENT
Start: 2025-03-17 | End: 2025-04-26

## 2025-03-17 NOTE — TELEPHONE ENCOUNTER
No care due was identified.  [facility] Embedded Care Due Messages. Reference number: [id number].   3/17/2025 10:34:22 AM CDT

## 2025-03-17 NOTE — TELEPHONE ENCOUNTER
Last office visit: 10/22/2024  Upcoming Appointment: 4/10/2025  ----- Message from Nellie sent at 3/17/2025 10:25 AM CDT -----  Type:  RX Refill RequestWho Called: ptRefill or New Rx:refillRX Name and Strength:valACYclovir (VALTREX) 1000 MG tabletHow is the patient currently taking it? (ex. 1XDay):as directedIs this a 30 day or 90 day RX:5 dayPreferred Pharmacy with phone number:[id number] - [place], [place] - [address] [address] [address] [address] [address]Aqqyc: [phone] Fax: [phone] or Mail Order:localOrdering Provider:Lakeisha the patient rather a call back or a response via [facility]? Call backBest Call Back Number: [phone] Information:  Please call Back to advise. Thanks!

## 2025-03-17 NOTE — TELEPHONE ENCOUNTER
Refill Decision Note   Shruthi Dempsey  is requesting a refill authorization.  Brief Assessment and Rationale for Refill:  Approve     Medication Therapy Plan:        Comments:     Note composed:11:50 AM 03/17/2025

## 2025-06-10 ENCOUNTER — TELEPHONE (OUTPATIENT)
Dept: FAMILY MEDICINE | Facility: CLINIC | Age: 40
End: 2025-06-10
Payer: COMMERCIAL

## 2025-06-10 DIAGNOSIS — B37.9 YEAST INFECTION: Primary | ICD-10-CM

## 2025-06-10 RX ORDER — FLUCONAZOLE 150 MG/1
150 TABLET ORAL DAILY
Qty: 1 TABLET | Refills: 1 | Status: SHIPPED | OUTPATIENT
Start: 2025-06-10 | End: 2025-06-11

## 2025-06-10 NOTE — TELEPHONE ENCOUNTER
Type: Needs Medical Advice  Who Called:  Patient  Symptoms (please be specific):  Yeast infection  How long has patient had these symptoms:  last couple of days  Pharmacy name and phone #:    [id number] - [place], [place] - [address]  [address]  [place] [address]  Phone: [phone] Fax: [phone]    Best Call Back Number: [phone]    Additional Information: Pt is calling back in regards to the msg sent at 8am this morning requesting diflucan for this since it is not getting better and is needing to speak to someone, Can we please call pt back ASAP to discuss. Thank You

## 2025-06-10 NOTE — TELEPHONE ENCOUNTER
Zayda Aguayo,  Please review this Rx request for Diflucan due to complaint of yeast infection ongoing x 3 days.   Spoke w/pt states calling for a Rx for diflucan due to c/o yeast infection ongoing x 3 days. Informed pt she needs an updated appt w/Dr. Mccray due too LOV 10/22/24, will call back soon.   Please review and advise.  Thank you  Gokul Haas LPN    Type: Needs Medical Advice  Who Called:  PT  Symptoms (please be specific):  Yeast Infection  How long has patient had these symptoms:  3 days  Pharmacy name and phone #:    [facility] #[id number], [place] - [facility] [address]  [address]  [place] [address]  Phone: [phone] Fax: [phone]    Best Call Back Number: [phone]  Additional Information: Would like msg left via portal to know when medication has been sent to pharm

## 2025-07-02 ENCOUNTER — OFFICE VISIT (OUTPATIENT)
Dept: FAMILY MEDICINE | Facility: CLINIC | Age: 40
End: 2025-07-02
Payer: COMMERCIAL

## 2025-07-02 VITALS
HEART RATE: 68 BPM | DIASTOLIC BLOOD PRESSURE: 62 MMHG | WEIGHT: 189.5 LBS | OXYGEN SATURATION: 99 % | BODY MASS INDEX: 31.53 KG/M2 | RESPIRATION RATE: 18 BRPM | SYSTOLIC BLOOD PRESSURE: 109 MMHG

## 2025-07-02 DIAGNOSIS — E66.09 CLASS 1 OBESITY DUE TO EXCESS CALORIES WITHOUT SERIOUS COMORBIDITY WITH BODY MASS INDEX (BMI) OF 31.0 TO 31.9 IN ADULT: Primary | ICD-10-CM

## 2025-07-02 DIAGNOSIS — G95.89 MYELOMALACIA OF CERVICAL CORD: ICD-10-CM

## 2025-07-02 DIAGNOSIS — L03.90 CELLULITIS, UNSPECIFIED CELLULITIS SITE: ICD-10-CM

## 2025-07-02 DIAGNOSIS — E66.811 CLASS 1 OBESITY DUE TO EXCESS CALORIES WITHOUT SERIOUS COMORBIDITY WITH BODY MASS INDEX (BMI) OF 31.0 TO 31.9 IN ADULT: Primary | ICD-10-CM

## 2025-07-02 RX ORDER — DOXYCYCLINE 100 MG/1
100 CAPSULE ORAL 2 TIMES DAILY
Qty: 14 CAPSULE | Refills: 0 | Status: SHIPPED | OUTPATIENT
Start: 2025-07-02 | End: 2025-07-09

## 2025-07-02 NOTE — PROGRESS NOTES
[facility]  [address], [address]  [place] [address]    Subjective     Patient ID: Shruthi Dempsey is a 40 y.o. female who comes to the clinic for a follow up visit.    Chief Complaint: Health Maintenance (Annual/ F/U)    Obesity - BMI of 31, takes semaglutide 0.25mg weekly. couldn't tolerate Wellbutrin as it made her irritable.     Neck pain - MRI in October 2024 showed a lot of abnormalities. I referred to neurosurgery, Dr. Domingo. Had neck surgery in Feb 2025 and is doing much better now    ROS negative unless stated above       Objective     Vitals:    07/02/25 1020   BP: 109/62   BP Location: Right arm   Patient Position: Sitting   Pulse: 68   Resp: 18   SpO2: 99%   Weight: 86 kg (189 lb 7.8 oz)        Wt Readings from Last 3 Encounters:   07/02/25 1020 86 kg (189 lb 7.8 oz)   11/27/24 0907 89.4 kg (197 lb)   11/14/24 0839 88.2 kg (194 lb 8 oz)        Physical Exam  Constitutional:       General: She is not in acute distress.     Appearance: Normal appearance. She is not ill-appearing.   HENT:      Head: Normocephalic and atraumatic.      Mouth/Throat:      Mouth: Mucous membranes are moist.   Eyes:      Extraocular Movements: Extraocular movements intact.      Pupils: Pupils are equal, round, and reactive to light.   Cardiovascular:      Rate and Rhythm: Normal rate.   Pulmonary:      Effort: Pulmonary effort is normal. No respiratory distress.   Musculoskeletal:         General: Normal range of motion.      Cervical back: Normal range of motion and neck supple.   Skin:     General: Skin is warm and dry.   Neurological:      General: No focal deficit present.      Mental Status: She is alert and oriented to person, place, and time. Mental status is at baseline.   Psychiatric:         Mood and Affect: Mood normal.         Behavior: Behavior normal.         Thought Content: Thought content normal.         Current Outpatient Medications    Medication Instructions    doxycycline (VIBRAMYCIN) 100 mg, Oral, 2 times daily    semaglutide (OZEMPIC) 0.25 mg, Every 7 days           Assessment and Plan     1. Class 1 obesity due to excess calories without serious comorbidity with body mass index (BMI) of 31.0 to 31.9 in adult    2. Myelomalacia of cervical cord    3. Cellulitis, unspecified cellulitis site  -     doxycycline (VIBRAMYCIN) 100 MG Cap; Take 1 capsule (100 mg total) by mouth 2 (two) times daily. for 7 days  Dispense: 14 capsule; Refill: 0        Here for follow up.    For obesity, continue semaglutide w/ Lula Wheeler in [place]    For neck pain, continue follow up w/ neurosurgery, doing much better now    The visit today included increased complexity associated with the care of an episodic problem, namely obesity, that was addressed and managed via longitudinal care.    RTC in 6 months        I encouraged the patient to take all medications as prescribed and to keep follow up appointments with their providers. ED precautions given for more concerning issues. Questions were invited and answered. Patient stated they had no other concerns. Follow up sooner if needed.     Harinder Mccray MD  07/02/2025 10:20 AM